# Patient Record
Sex: FEMALE | Race: WHITE | NOT HISPANIC OR LATINO | Employment: OTHER | URBAN - METROPOLITAN AREA
[De-identification: names, ages, dates, MRNs, and addresses within clinical notes are randomized per-mention and may not be internally consistent; named-entity substitution may affect disease eponyms.]

---

## 2018-08-17 ENCOUNTER — APPOINTMENT (EMERGENCY)
Dept: CT IMAGING | Facility: HOSPITAL | Age: 73
End: 2018-08-17
Payer: MEDICARE

## 2018-08-17 ENCOUNTER — HOSPITAL ENCOUNTER (EMERGENCY)
Facility: HOSPITAL | Age: 73
Discharge: HOME | End: 2018-08-17
Attending: EMERGENCY MEDICINE | Admitting: EMERGENCY MEDICINE
Payer: MEDICARE

## 2018-08-17 VITALS
HEART RATE: 98 BPM | OXYGEN SATURATION: 95 % | SYSTOLIC BLOOD PRESSURE: 117 MMHG | DIASTOLIC BLOOD PRESSURE: 68 MMHG | WEIGHT: 123.6 LBS | RESPIRATION RATE: 18 BRPM

## 2018-08-17 DIAGNOSIS — F03.90 DEMENTIA (HCC): ICD-10-CM

## 2018-08-17 DIAGNOSIS — R45.1 AGITATION: Primary | ICD-10-CM

## 2018-08-17 LAB
ALBUMIN SERPL BCP-MCNC: 3.4 G/DL (ref 3.5–5)
ALP SERPL-CCNC: 79 U/L (ref 46–116)
ALT SERPL W P-5'-P-CCNC: 19 U/L (ref 12–78)
AMMONIA PLAS-SCNC: 15 UMOL/L (ref 11–35)
ANION GAP SERPL CALCULATED.3IONS-SCNC: 1 MMOL/L (ref 4–13)
APAP SERPL-MCNC: <2 UG/ML (ref 10–30)
APTT PPP: 28 SECONDS (ref 24–36)
AST SERPL W P-5'-P-CCNC: 17 U/L (ref 5–45)
BACTERIA UR QL AUTO: ABNORMAL /HPF
BASOPHILS # BLD AUTO: 0.03 THOUSANDS/ΜL (ref 0–0.1)
BASOPHILS NFR BLD AUTO: 0 % (ref 0–1)
BILIRUB SERPL-MCNC: 0.23 MG/DL (ref 0.2–1)
BILIRUB UR QL STRIP: NEGATIVE
BUN SERPL-MCNC: 35 MG/DL (ref 5–25)
CALCIUM SERPL-MCNC: 9.5 MG/DL (ref 8.3–10.1)
CHLORIDE SERPL-SCNC: 107 MMOL/L (ref 100–108)
CLARITY UR: CLEAR
CO2 SERPL-SCNC: 35 MMOL/L (ref 21–32)
COLOR UR: YELLOW
COLOR, POC: YELLOW
CREAT SERPL-MCNC: 0.71 MG/DL (ref 0.6–1.3)
EOSINOPHIL # BLD AUTO: 0.05 THOUSAND/ΜL (ref 0–0.61)
EOSINOPHIL NFR BLD AUTO: 1 % (ref 0–6)
ERYTHROCYTE [DISTWIDTH] IN BLOOD BY AUTOMATED COUNT: 13.7 % (ref 11.6–15.1)
ETHANOL SERPL-MCNC: 3 MG/DL (ref 0–3)
GFR SERPL CREATININE-BSD FRML MDRD: 84 ML/MIN/1.73SQ M
GLUCOSE SERPL-MCNC: 79 MG/DL (ref 65–140)
GLUCOSE UR STRIP-MCNC: NEGATIVE MG/DL
HCT VFR BLD AUTO: 36.7 % (ref 34.8–46.1)
HGB BLD-MCNC: 11.7 G/DL (ref 11.5–15.4)
HGB UR QL STRIP.AUTO: ABNORMAL
INR PPP: 1.02 (ref 0.86–1.17)
KETONES UR STRIP-MCNC: ABNORMAL MG/DL
LEUKOCYTE ESTERASE UR QL STRIP: NEGATIVE
LYMPHOCYTES # BLD AUTO: 2.73 THOUSANDS/ΜL (ref 0.6–4.47)
LYMPHOCYTES NFR BLD AUTO: 32 % (ref 14–44)
MCH RBC QN AUTO: 31.2 PG (ref 26.8–34.3)
MCHC RBC AUTO-ENTMCNC: 31.9 G/DL (ref 31.4–37.4)
MCV RBC AUTO: 98 FL (ref 82–98)
MONOCYTES # BLD AUTO: 0.81 THOUSAND/ΜL (ref 0.17–1.22)
MONOCYTES NFR BLD AUTO: 10 % (ref 4–12)
NEUTROPHILS # BLD AUTO: 4.89 THOUSANDS/ΜL (ref 1.85–7.62)
NEUTS SEG NFR BLD AUTO: 57 % (ref 43–75)
NITRITE UR QL STRIP: NEGATIVE
NON-SQ EPI CELLS URNS QL MICRO: ABNORMAL /HPF
NRBC BLD AUTO-RTO: 0 /100 WBCS
PH UR STRIP.AUTO: 6.5 [PH] (ref 4.5–8)
PLATELET # BLD AUTO: 402 THOUSANDS/UL (ref 149–390)
PMV BLD AUTO: 10.3 FL (ref 8.9–12.7)
POTASSIUM SERPL-SCNC: 4.8 MMOL/L (ref 3.5–5.3)
PROT SERPL-MCNC: 6.8 G/DL (ref 6.4–8.2)
PROT UR STRIP-MCNC: ABNORMAL MG/DL
PROTHROMBIN TIME: 13.5 SECONDS (ref 11.8–14.2)
RBC # BLD AUTO: 3.75 MILLION/UL (ref 3.81–5.12)
RBC #/AREA URNS AUTO: ABNORMAL /HPF
SALICYLATES SERPL-MCNC: <3 MG/DL (ref 3–20)
SODIUM SERPL-SCNC: 143 MMOL/L (ref 136–145)
SP GR UR STRIP.AUTO: 1.02 (ref 1–1.03)
TSH SERPL DL<=0.05 MIU/L-ACNC: 1.4 UIU/ML (ref 0.36–3.74)
UROBILINOGEN UR QL STRIP.AUTO: 0.2 E.U./DL
WBC # BLD AUTO: 8.51 THOUSAND/UL (ref 4.31–10.16)
WBC #/AREA URNS AUTO: ABNORMAL /HPF

## 2018-08-17 PROCEDURE — 82140 ASSAY OF AMMONIA: CPT | Performed by: EMERGENCY MEDICINE

## 2018-08-17 PROCEDURE — 81001 URINALYSIS AUTO W/SCOPE: CPT

## 2018-08-17 PROCEDURE — 85730 THROMBOPLASTIN TIME PARTIAL: CPT | Performed by: EMERGENCY MEDICINE

## 2018-08-17 PROCEDURE — 85025 COMPLETE CBC W/AUTO DIFF WBC: CPT | Performed by: EMERGENCY MEDICINE

## 2018-08-17 PROCEDURE — 84443 ASSAY THYROID STIM HORMONE: CPT | Performed by: EMERGENCY MEDICINE

## 2018-08-17 PROCEDURE — 99285 EMERGENCY DEPT VISIT HI MDM: CPT

## 2018-08-17 PROCEDURE — 36415 COLL VENOUS BLD VENIPUNCTURE: CPT | Performed by: EMERGENCY MEDICINE

## 2018-08-17 PROCEDURE — 80053 COMPREHEN METABOLIC PANEL: CPT | Performed by: EMERGENCY MEDICINE

## 2018-08-17 PROCEDURE — 96372 THER/PROPH/DIAG INJ SC/IM: CPT

## 2018-08-17 PROCEDURE — 80329 ANALGESICS NON-OPIOID 1 OR 2: CPT | Performed by: EMERGENCY MEDICINE

## 2018-08-17 PROCEDURE — 70450 CT HEAD/BRAIN W/O DYE: CPT

## 2018-08-17 PROCEDURE — 80320 DRUG SCREEN QUANTALCOHOLS: CPT | Performed by: EMERGENCY MEDICINE

## 2018-08-17 PROCEDURE — 81002 URINALYSIS NONAUTO W/O SCOPE: CPT | Performed by: EMERGENCY MEDICINE

## 2018-08-17 PROCEDURE — 85610 PROTHROMBIN TIME: CPT | Performed by: EMERGENCY MEDICINE

## 2018-08-17 RX ORDER — OLANZAPINE 10 MG/1
5 INJECTION, POWDER, LYOPHILIZED, FOR SOLUTION INTRAMUSCULAR ONCE
Status: COMPLETED | OUTPATIENT
Start: 2018-08-17 | End: 2018-08-17

## 2018-08-17 RX ORDER — OLANZAPINE 10 MG/1
10 INJECTION, POWDER, LYOPHILIZED, FOR SOLUTION INTRAMUSCULAR ONCE
Status: COMPLETED | OUTPATIENT
Start: 2018-08-17 | End: 2018-08-17

## 2018-08-17 RX ADMIN — OLANZAPINE 10 MG: 10 INJECTION, POWDER, FOR SOLUTION INTRAMUSCULAR at 19:35

## 2018-08-17 RX ADMIN — WATER 2.1 ML: 1 INJECTION INTRAMUSCULAR; INTRAVENOUS; SUBCUTANEOUS at 14:04

## 2018-08-17 RX ADMIN — WATER 2.1 ML: 1 INJECTION INTRAMUSCULAR; INTRAVENOUS; SUBCUTANEOUS at 15:26

## 2018-08-17 RX ADMIN — Medication 2.1 ML: at 15:26

## 2018-08-17 RX ADMIN — OLANZAPINE 5 MG: 10 INJECTION, POWDER, FOR SOLUTION INTRAMUSCULAR at 15:25

## 2018-08-17 RX ADMIN — Medication 2.1 ML: at 14:04

## 2018-08-17 RX ADMIN — OLANZAPINE 5 MG: 10 INJECTION, POWDER, FOR SOLUTION INTRAMUSCULAR at 14:03

## 2018-08-17 NOTE — ED NOTES
Upon arrival, pt agitated and uncooperative with care  Pt kicking, biting and scratching staff  Pt unable to provide history and is not redirectable  Pt room in direct view of nursing desk  Lights dimmed to decrease stimulation  Without interaction, pt calm and cooperative resting on stretcher with easy, nonlabored respirations        Leonela Sanchez RN  08/17/18 1370

## 2018-08-17 NOTE — ED PROVIDER NOTES
History  Chief Complaint   Patient presents with    Agitation     Pt coming from [facility] to be evaluated for worsening agitation  Received a call from Dr Lyn Oreilly PTA stating with was diagnosed with dementia in may and has a rapid decline  Dr Lyn Oreilly reports that pt has been evaluated in multiple EDs and admitted to inpatient psych however stated "to his knowledge the patient has not received a medical workup for her symptoms " Dr Lyn Oreilly reports pt has a hx of a fall and reports he is sending the patient to us to rule out chronic subdural        A 77-year-old female with past medical history of Alzheimer's dementia, GERD and PE on Xarelto; presents from the nursing facility for worsening agitation  Charge nurse did receive a phone call from the facility physician earlier today, who reported the patient has had a rapid decline in her dementia since May  He reported that the patient had been evaluated several times in the ED, as well as psychiatric facilities for the dementia without a diagnosis  He was uncertain if a complete medical workup had been done  Physician also reported a fall back in May, prior to the worsening of her dementia  He reported that the patient became acutely worse and agitated today, regardless of Ativan which prompted ED evaluation  Upon arrival to the emergency department, patient was screaming and yelling, very combative with staff  Patient was unable to provide any meaningful history or review of systems secondary to her agitation and dementia  A/P:  Agitation, acute worsening from baseline  Patient with a history of significant dementia  Patient does reside in a locked dementia unit  Patient currently combative and agitated with staff  Will obtain lab work to evaluate for electrolyte abnormality, anemia, renal impairment and infection  Will check urine for infection  Will obtain CT head given fall several months ago    Will give IM Zyprexa now given patient's acute agitation  History provided by:  EMS personnel, nursing home and medical records    None       Past Medical History:   Diagnosis Date    Alzheimer's dementia     Anxiety     Chronic back pain     Chronic disorder     GERD (gastroesophageal reflux disease)     Pulmonary emboli ([address] )        Past Surgical History:   Procedure Laterality Date    CHOLECYSTECTOMY      HYSTERECTOMY      JOINT REPLACEMENT         No family history on file  I have reviewed and agree with the history as documented  Social History   Substance Use Topics    Smoking status: Not on file    Smokeless tobacco: Not on file    Alcohol use No        Review of Systems   Unable to perform ROS: Dementia       Physical Exam  Physical Exam  General Appearance: Awake, screaming and yelling  Combative    Non toxic appearing  Skin:  Warm, dry, intact  HEENT: atraumatic, normocephalic  Neck: Supple, trachea midline  Cardiac: RRR; no murmurs, rub, gallops  Pulmonary: lungs CTAB; no wheezes, rales, rhonchi  Gastrointestinal: abdomen soft, nontender, nondistended; no guarding or rebound tenderness; good bowel sounds, no mass or bruits  Extremities:  no pedal edema, 2+ pulses; no calf tenderness, no clubbing, no cyanosis  Neuro:  Moving bilateral upper and lower extremities equally and purposefully  Psych:  Agitated      Vital Signs  ED Triage Vitals [08/17/18 1325]   Temp Pulse Respirations Blood Pressure SpO2   -- (!) 117 22 136/74 95 %      Temp src Heart Rate Source Patient Position - Orthostatic VS BP Location FiO2 (%)   -- Monitor Lying Right arm --      Pain Score       --           Vitals:    08/17/18 1325 08/17/18 1601 08/17/18 2056   BP: 136/74 97/62 117/68   Pulse: (!) 117 105 98   Patient Position - Orthostatic VS: Lying  Lying       Visual Acuity      ED Medications  Medications   OLANZapine (ZyPREXA) IM injection 5 mg (5 mg Intramuscular Given 8/17/18 1403)   sterile water injection 2 1 mL (2 1 mL Injection Given 8/17/18 1404) OLANZapine (ZyPREXA) IM injection 5 mg (5 mg Intramuscular Given 8/17/18 1525)   sterile water injection 2 1 mL (2 1 mL Injection Given 8/17/18 1526)   OLANZapine (ZyPREXA) IM injection 10 mg (10 mg Intramuscular Given 8/17/18 1935)       Diagnostic Studies  Results Reviewed     Procedure Component Value Units Date/Time    Urine Microscopic [[id number]]  (Abnormal) Collected:  08/17/18 1657    Lab Status:  Final result Specimen:  Urine from Urine, Clean Catch Updated:  08/17/18 1747     RBC, UA 10-20 (A) /hpf      WBC, UA 0-1 (A) /hpf      Epithelial Cells Occasional /hpf      Bacteria, UA Occasional /hpf     POCT urinalysis dipstick [[id number]]  (Normal) Resulted:  08/17/18 1650    Lab Status:  Final result Specimen:  Urine Updated:  08/17/18 1650     Color, UA yellow    ED Urine Macroscopic [[id number]]  (Abnormal) Collected:  08/17/18 1657    Lab Status:  Final result Specimen:  Urine Updated:  08/17/18 1649     Color, UA Yellow     Clarity, UA Clear     pH, UA 6 5     Leukocytes, UA Negative     Nitrite, UA Negative     Protein, UA 30 (1+) (A) mg/dl      Glucose, UA Negative mg/dl      Ketones, UA Trace (A) mg/dl      Urobilinogen, UA 0 2 E U /dl      Bilirubin, UA Negative     Blood, UA Moderate (A)     Specific [place], UA 1 020    Narrative:       CLINITEK RESULT    Acetaminophen level [[id number]]  (Abnormal) Collected:  08/17/18 1512    Lab Status:  Final result Specimen:  Blood from Arm, Right Updated:  08/17/18 1625     Acetaminophen Level <2 (L) ug/mL     TSH [[id number]]  (Normal) Collected:  08/17/18 1512    Lab Status:  Final result Specimen:  Blood from Arm, Right Updated:  08/17/18 1614     TSH 3RD GENERATON 1 403 uIU/mL     Narrative:         Patients undergoing fluorescein dye angiography may retain small amounts of fluorescein in the body for 48-72 hours post procedure  Samples containing fluorescein can produce falsely depressed TSH values   If the patient had this procedure,a specimen should be resubmitted post fluorescein clearance  The recommended reference ranges for TSH during pregnancy are as follows:  First trimester 0 1 to 2 5 uIU/mL  Second trimester  0 2 to 3 0 uIU/mL  Third trimester 0 3 to 3 0 uIU/m      Salicylate level [[id number]]  (Abnormal) Collected:  08/17/18 1512    Lab Status:  Final result Specimen:  Blood from Arm, Right Updated:  07/54/24 3817     Salicylate Lvl <3 (L) mg/dL     Comprehensive metabolic panel [[id number]]  (Abnormal) Collected:  08/17/18 1512    Lab Status:  Final result Specimen:  Blood from Arm, Right Updated:  08/17/18 1543     Sodium 143 mmol/L      Potassium 4 8 mmol/L      Chloride 107 mmol/L      CO2 35 (H) mmol/L      Anion Gap 1 (L) mmol/L      BUN 35 (H) mg/dL      Creatinine 0 71 mg/dL      Glucose 79 mg/dL      Calcium 9 5 mg/dL      AST 17 U/L      ALT 19 U/L      Alkaline Phosphatase 79 U/L      Total Protein 6 8 g/dL      Albumin 3 4 (L) g/dL      Total Bilirubin 0 23 mg/dL      eGFR 84 ml/min/1 73sq m     Narrative:         National Kidney Disease Education Program recommendations are as follows:  GFR calculation is accurate only with a steady state creatinine  Chronic Kidney disease less than 60 ml/min/1 73 sq  meters  Kidney failure less than 15 ml/min/1 73 sq  meters      Ammonia [[id number]]  (Normal) Collected:  08/17/18 1512    Lab Status:  Final result Specimen:  Blood from Arm, Right Updated:  08/17/18 1539     Ammonia 15 umol/L     Ethanol [[id number]]  (Normal) Collected:  08/17/18 1512    Lab Status:  Final result Specimen:  Blood from Arm, Right Updated:  08/17/18 1537     Ethanol Lvl 3 mg/dL     Protime-INR [[id number]]  (Normal) Collected:  08/17/18 1512    Lab Status:  Final result Specimen:  Blood from Arm, Right Updated:  08/17/18 1531     Protime 13 5 seconds      INR 1 02    APTT [[id number]]  (Normal) Collected:  08/17/18 1512    Lab Status:  Final result Specimen:  Blood from Arm, Right Updated:  08/17/18 1531     PTT 28 seconds CBC and differential [[id number]]  (Abnormal) Collected:  08/17/18 1512    Lab Status:  Final result Specimen:  Blood from Arm, Right Updated:  08/17/18 1523     WBC 8 51 Thousand/uL      RBC 3 75 (L) Million/uL      Hemoglobin 11 7 g/dL      Hematocrit 36 7 %      MCV 98 fL      MCH 31 2 pg      MCHC 31 9 g/dL      RDW 13 7 %      MPV 10 3 fL      Platelets 677 (H) Thousands/uL      nRBC 0 /100 WBCs      Neutrophils Relative 57 %      Lymphocytes Relative 32 %      Monocytes Relative 10 %      Eosinophils Relative 1 %      Basophils Relative 0 %      Neutrophils Absolute 4 89 Thousands/µL      Lymphocytes Absolute 2 73 Thousands/µL      Monocytes Absolute 0 81 Thousand/µL      Eosinophils Absolute 0 05 Thousand/µL      Basophils Absolute 0 03 Thousands/µL                  CT head without contrast   Final Result by Piotr Galvez MD (08/17 1600)      No acute intracranial abnormality  Workstation performed: [id number]                    Procedures  Procedures       Phone Contacts  ED Phone Contact    ED Course  ED Course as of Aug 18 2224   Fri Aug 17, 2018   1507 Pt with worsening agitation, kicking and yelling at this time  Will give additional Zyprexa IM for CT scan  1620 Imaging and lab work within normal limits  UA pending                                MDM  CritCare Time    Disposition  Final diagnoses:   Agitation   Dementia     Time reflects when diagnosis was documented in both MDM as applicable and the Disposition within this note     Time User Action Codes Description Comment    8/17/2018  4:49 PM Lupe Cruz [R45 1] Agitation     8/17/2018  4:49 PM [address]  [facility] Add [F03 90] Dementia       ED Disposition     ED Disposition Condition Comment    Discharge  Horace Hashoshana discharge to home/self care  Condition at discharge: Good        Follow-up Information    None         There are no discharge medications for this patient  No discharge procedures on file      ED Provider  Electronically Signed by           Joel Tan DO  08/18/18 1818

## 2018-08-17 NOTE — ED NOTES
Leatha Gallegos 647 made aware of pt's pending discharge and transportation arrangements          Toro Holt RN  08/17/18 2585

## 2018-08-17 NOTE — ED NOTES
Pt will be reassessed for agitation prior to obtaining IV access and blood specimens          Gely Abel RN  08/17/18 7182

## 2018-08-17 NOTE — ED NOTES
Transport to be set up for transportation back to [facility]          Toro Holt RN  08/17/18 0882

## 2018-08-17 NOTE — ED NOTES
Patient awake and out of bed  Becoming increasingly agitated, unable to redirect patient  Patient kicking, slapping and spitting at staff  Dr Meng rivera, orders received       Nitza Cruz, TIGRE  08/17/18 0340

## 2018-08-17 NOTE — DISCHARGE INSTRUCTIONS
Dementia, Ambulatory Care   GENERAL INFORMATION:   Dementia  is a condition that causes loss of memory, thought control, and judgment  Dementia may develop quickly over a few months after a head injury or stroke  It may develop slowly over many years if you have Alzheimer disease  Dementia cannot be cured or prevented, but treatment may slow or reduce your symptoms  Common symptoms include the following:   · Loss of short-term memory, followed by loss of long-term memory    · Trouble remembering to go to the bathroom, to urinate, or have a bowel movement    · Anger or violent behavior     · Depression, anxiety, or hallucinations  Seek immediate care for the following symptoms:   · Signs of delirium, such as extreme confusion, and seeing or hearing things that are not there    · Anger or violence that cannot be calmed down    · Fainting and you cannot be woken  Treatment for dementia  may include medicines to slow memory loss  You may also need medicines to help control anger, decrease anxiety, or improve your mood  Take your medicines as directed  Manage dementia:   · Keep your mind and body active  Do activities that you love, such as art, gardening, or listening to music  Call or visit people often  This will keep your social skills sharp, and may help reduce depression  · Write daily schedules and routines  Record medical appointments, times to take your medicines, meal times, or any other things to remember  Write down reminders to use the bathroom if you have trouble remembering  You may need to ask someone to write things down for you  · Place clocks and calendars where you can see them  This will help you remember appointments and tasks  · Do not smoke  If you smoke, it is never too late to quit  Smoking may slow blood flow in your brain, and make your symptoms worse  Ask your caregiver for information if you need help quitting  · Eat healthy foods    Examples are fruits, vegetables, whole-grain breads, low-fat dairy products, beans, lean meats, and fish  Ask if you need to be on a special diet  · Ask your healthcare provider for a list of organizations that can help  You may begin to need an in-home aide to help you remember your daily tasks  Arrange for help while you are thinking clearly  Follow up with your healthcare provider as directed:  Ask someone to go with you to help you remember what your healthcare provider tells you  The person can take notes for you during the visit and go over the notes with you later  Write down your questions so you remember to ask them during your visits  CARE AGREEMENT:   You have the right to help plan your care  Learn about your health condition and how it may be treated  Discuss treatment options with your caregivers to decide what care you want to receive  You always have the right to refuse treatment  The above information is an  only  It is not intended as medical advice for individual conditions or treatments  Talk to your doctor, nurse or pharmacist before following any medical regimen to see if it is safe and effective for you  © 2014 [address] is for End User's use only and may not be sold, redistributed or otherwise used for commercial purposes  All illustrations and images included in CareNotes® are the copyrighted property of A D [employer] M [employer]  or Darrell Latham

## 2018-08-17 NOTE — ED NOTES
While attempting to obtain IV access and blood specimens pt became agitated  Pt continues to try to get out of bed and is aggressive towards staff when attempting to redirect  Dr Vinita Coburn RN  08/17/18 4087